# Patient Record
Sex: MALE | Race: BLACK OR AFRICAN AMERICAN | NOT HISPANIC OR LATINO | Employment: OTHER | URBAN - METROPOLITAN AREA
[De-identification: names, ages, dates, MRNs, and addresses within clinical notes are randomized per-mention and may not be internally consistent; named-entity substitution may affect disease eponyms.]

---

## 2024-08-02 ENCOUNTER — OFFICE VISIT (OUTPATIENT)
Dept: PULMONOLOGY | Facility: CLINIC | Age: 59
End: 2024-08-02
Payer: COMMERCIAL

## 2024-08-02 VITALS
DIASTOLIC BLOOD PRESSURE: 84 MMHG | WEIGHT: 263.69 LBS | HEART RATE: 56 BPM | HEIGHT: 75 IN | SYSTOLIC BLOOD PRESSURE: 126 MMHG | OXYGEN SATURATION: 96 % | RESPIRATION RATE: 21 BRPM | BODY MASS INDEX: 32.79 KG/M2

## 2024-08-02 DIAGNOSIS — G47.33 OSA ON CPAP: Primary | ICD-10-CM

## 2024-08-02 DIAGNOSIS — I10 HYPERTENSION, UNSPECIFIED TYPE: ICD-10-CM

## 2024-08-02 DIAGNOSIS — Z86.69 HISTORY OF OBSTRUCTIVE SLEEP APNEA: ICD-10-CM

## 2024-08-02 DIAGNOSIS — Z71.89 CPAP USE COUNSELING: ICD-10-CM

## 2024-08-02 PROCEDURE — 99999 PR PBB SHADOW E&M-NEW PATIENT-LVL III: CPT | Mod: PBBFAC,,, | Performed by: INTERNAL MEDICINE

## 2024-08-02 RX ORDER — OLMESARTAN MEDOXOMIL, AMLODIPINE AND HYDROCHLOROTHIAZIDE TABLET 40/10/25 MG 40; 10; 25 MG/1; MG/1; MG/1
TABLET ORAL
COMMUNITY

## 2024-08-02 RX ORDER — TADALAFIL 20 MG/1
1 TABLET ORAL DAILY PRN
COMMUNITY

## 2024-08-02 RX ORDER — EPLERENONE 50 MG/1
1 TABLET, FILM COATED ORAL DAILY
COMMUNITY
Start: 2017-07-27

## 2024-08-02 NOTE — PROGRESS NOTES
"                                            Initial Outpatient Pulmonary Evaluation       SUBJECTIVE:     Chief Complaint   Patient presents with    Sleep Apnea       History of Present Illness:    Patient is a 59 y.o. male with known history of obstructive sleep apnea using his CPAP machine which was replaced Earle Sleepiness scale score 1 is here to establish care for ROEL.  Previously treated in [place].    Operation management [facility]     Dreamstation 2 Auto PAP replaced     FFM LARGE       Review of Systems   Respiratory:  Positive for apnea.    Psychiatric/Behavioral:  Positive for sleep disturbance.        Review of patient's allergies indicates:   Allergen Reactions    Fish containing products Other (See Comments)       Current Outpatient Medications   Medication Sig Dispense Refill    eplerenone (INSPRA) 50 MG Tab Take 1 tablet by mouth once daily.      olmesartan-amLODIPin-hcthiazid 40-10-25 mg Tab Take 1 tablet every day by oral route as directed for 90 days.      tadalafiL (CIALIS) 20 MG Tab Take 1 tablet by mouth daily as needed.       No current facility-administered medications for this visit.       No past medical history on file.  No past surgical history on file.  No family history on file.  Social History     Tobacco Use    Smoking status: Never    Smokeless tobacco: Never          OBJECTIVE:     Vital Signs (Most Recent)  Vital Signs  Pulse: (!) 56  Resp: (!) 21  SpO2: 96 %  BP: 126/84  Height and Weight  Height: 6' 3" (190.5 cm)  Weight: 119.6 kg (263 lb 10.7 oz)  BSA (Calculated - sq m): 2.52 sq meters  BMI (Calculated): 33  Weight in (lb) to have BMI = 25: 199.6]  Wt Readings from Last 2 Encounters:   08/02/24 119.6 kg (263 lb 10.7 oz)         Physical Exam:  Physical Exam   Cardiovascular: Normal rate and regular rhythm.   Pulmonary/Chest: Normal expansion, effort normal and breath sounds normal.       Laboratory  No results found for: "WBC", "RBC", "HGB", "HCT", "MCV", " ""MCH", "MCHC", "RDW", "PLT", "MPV", "GRAN", "LYMPH", "MONO", "EOS", "BASO", "EOSINOPHIL", "BASOPHIL"    BMP  No results found for: "NA", "K", "CL", "CO2", "BUN", "CREATININE", "CALCIUM", "ANIONGAP", "ESTGFRAFRICA", "EGFRNONAA", "AST", "ALT", "PROT"    No results found for: "BNP"    No results found for: "TSH"    No results found for: "SEDRATE"    No results found for: "CRP"    No results found for: "IGE"    No results found for: "ASPERGILLUS"  No results found for: "AFUMIGATUSCL"     No results found for: "ACE"    Diagnostic Results:  I have personally reviewed today the following studies :        ASSESSMENT/PLAN:     ROEL on CPAP  -     CPAP/BIPAP SUPPLIES    CPAP use counseling  -     CPAP/BIPAP SUPPLIES    History of obstructive sleep apnea  -     CPAP/BIPAP SUPPLIES    Hypertension, unspecified type          Using and benefitting from CPAP therapy.  Will order supplies.  He would like to continue using his [place] [employer] company.      He will bring his machine next visit as well as a copy of his sleep study.    Continue current antihypertensives.  Monitor BP.  Follow up in about 6 months (around 2/2/2025).    This note was prepared using voice recognition system and is likely to have sound alike errors that may have been overlooked even after proof reading.  Please call me with any questions    Discussed diagnosis, its evaluation, treatment and usual course. All questions answered.    Thank you for the courtesy of participating in the care of this patient    Lianet Bourne MD          "

## 2024-12-27 ENCOUNTER — OFFICE VISIT (OUTPATIENT)
Dept: PULMONOLOGY | Facility: CLINIC | Age: 59
End: 2024-12-27
Payer: COMMERCIAL

## 2024-12-27 ENCOUNTER — PATIENT MESSAGE (OUTPATIENT)
Dept: PULMONOLOGY | Facility: CLINIC | Age: 59
End: 2024-12-27

## 2024-12-27 VITALS
OXYGEN SATURATION: 98 % | RESPIRATION RATE: 20 BRPM | DIASTOLIC BLOOD PRESSURE: 84 MMHG | SYSTOLIC BLOOD PRESSURE: 142 MMHG | HEIGHT: 75 IN | HEART RATE: 65 BPM | WEIGHT: 265.75 LBS | BODY MASS INDEX: 33.04 KG/M2

## 2024-12-27 DIAGNOSIS — G47.33 CONTROLLED NONFAMILIAL OBSTRUCTIVE SLEEP APNEA: ICD-10-CM

## 2024-12-27 DIAGNOSIS — Z71.89 CPAP USE COUNSELING: ICD-10-CM

## 2024-12-27 DIAGNOSIS — I10 HYPERTENSION, UNSPECIFIED TYPE: ICD-10-CM

## 2024-12-27 DIAGNOSIS — G47.33 OSA ON CPAP: Primary | ICD-10-CM

## 2024-12-27 PROCEDURE — 99999 PR PBB SHADOW E&M-EST. PATIENT-LVL III: CPT | Mod: PBBFAC,,, | Performed by: INTERNAL MEDICINE

## 2024-12-27 NOTE — PROGRESS NOTES
"                                         Pulmonary Outpatient Follow Up Visit     Subjective:       Patient ID: Ryan Lr is a 59 y.o. male.    Chief Complaint: Sleep Apnea      HPI          Patient is a 59 y.o. male presenting for follow-up.      12/27/2024 dream Station 2 reviewed.  Adherent.  358  out of 365 >4 hrs.  AHI detected well controlled. Mask fit optimal FFM .    He is known history of obstructive sleep apnea using his CPAP machine which was replaced [place]ufur Sleepiness scale score 1 is here to establish care for ROEL.  Previously treated in [place].    Operation management [facility] .    Dreamstation 2 Auto PAP replaced     FFM LARGE     40 lbs loss since prior test.      Review of Systems   Respiratory:  Positive for apnea and snoring.    Psychiatric/Behavioral:  Positive for sleep disturbance.        Outpatient Encounter Medications as of 12/27/2024   Medication Sig Dispense Refill    eplerenone (INSPRA) 50 MG Tab Take 1 tablet by mouth once daily.      olmesartan-amLODIPin-hcthiazid 40-10-25 mg Tab Take 1 tablet every day by oral route as directed for 90 days.      tadalafiL (CIALIS) 20 MG Tab Take 1 tablet by mouth daily as needed.       No facility-administered encounter medications on file as of 12/27/2024.       Objective:     Vital Signs (Most Recent)  Vital Signs  Pulse: 65  Resp: 20  SpO2: 98 %  BP: (!) 142/84  Pain Score: 0-No pain  Height and Weight  Height: 6' 3" (190.5 cm)  Weight: 120.5 kg (265 lb 12.2 oz)  BSA (Calculated - sq m): 2.53 sq meters  BMI (Calculated): 33.2  Weight in (lb) to have BMI = 25: 199.6]  Wt Readings from Last 2 Encounters:   12/27/24 120.5 kg (265 lb 12.2 oz)   08/02/24 119.6 kg (263 lb 10.7 oz)       Physical Exam   Constitutional: He is oriented to person, place, and time.   Pulmonary/Chest: No respiratory distress.   Neurological: He is alert and oriented to person, place, and time.       Laboratory  No results found " "for: "WBC", "RBC", "HGB", "HCT", "MCV", "MCH", "MCHC", "RDW", "PLT", "MPV", "GRAN", "LYMPH", "MONO", "EOS", "BASO", "EOSINOPHIL", "BASOPHIL"    BMP  No results found for: "NA", "K", "CL", "CO2", "BUN", "CREATININE", "CALCIUM", "ANIONGAP", "ESTGFRAFRICA", "EGFRNONAA", "AST", "ALT", "PROT"    No results found for: "BNP"    No results found for: "TSH"    No results found for: "SEDRATE"    No results found for: "CRP"  No results found for: "IGE"     No results found for: "ASPERGILLUS"  No results found for: "AFUMIGATUSCL"     No results found for: "ACE"     Diagnostic Results:  I have personally reviewed today the following studies:      Assessment/Plan:   ROEL on CPAP    CPAP use counseling    History of obstructive sleep apnea    Hypertension, unspecified type      Will decide on repeat sleep study and let me know since he managed 40 lb    Continue CPAP 5-20 CM     90% pressure 9 cm     DDE 5-6 /hr     Cont HTN meds monitor BP     Follow up with PMD     Follow up in about 1 year (around 12/27/2025).    This note was prepared using voice recognition system and is likely to have sound alike errors that may have been overlooked even after proof reading.  Please call me with any questions    Discussed diagnosis, its evaluation, treatment and usual course. All questions answered.      Lianet Bourne MD    "

## 2025-02-17 ENCOUNTER — PATIENT MESSAGE (OUTPATIENT)
Dept: PULMONOLOGY | Facility: CLINIC | Age: 60
End: 2025-02-17
Payer: COMMERCIAL

## 2025-06-13 ENCOUNTER — TELEPHONE (OUTPATIENT)
Dept: PULMONOLOGY | Facility: CLINIC | Age: 60
End: 2025-06-13
Payer: COMMERCIAL

## 2025-06-13 DIAGNOSIS — G47.33 OSA ON CPAP: Primary | ICD-10-CM
